# Patient Record
Sex: FEMALE
[De-identification: names, ages, dates, MRNs, and addresses within clinical notes are randomized per-mention and may not be internally consistent; named-entity substitution may affect disease eponyms.]

---

## 2021-11-24 LAB
BUN BLD-MCNC: 18 MG/DL (ref 7–18)
GLUCOSE SERPLBLD-MCNC: 178 MG/DL (ref 74–106)
HCT VFR BLD CALC: 39.1 % (ref 36–45)
INR BLD: 1.03
LYMPHOCYTES # SPEC AUTO: 1.5 K/UL (ref 0.7–4.9)
PMV BLD: 7.2 FL (ref 7.6–11.3)
POTASSIUM SERPL-SCNC: 4.1 MMOL/L (ref 3.5–5.1)
RBC # BLD: 4.44 M/UL (ref 3.86–4.86)

## 2021-11-24 NOTE — RAD REPORT
EXAM DESCRIPTION:  RAD - Chest Pa And Lat (2 Views) - 11/24/2021 9:47 am

 

CLINICAL HISTORY:  Pre-op cath procedure

 

COMPARISON:  None

 

TECHNIQUE:  Frontal and lateral views of the chest were obtained.

 

FINDINGS:  The lungs are clear of failure, infiltrate or mass. Mild fibrotic lung pattern seen.   Hea
rt size is normal and central vasculature is within normal limits.  No pleural effusion or pneumothor
ax seen.  No acute bony finding noted.  No acute aortic finding. Several small surgical clips are pre
sent in the mediastinum in proximity to the aorta.

 

IMPRESSION:  Scattered fibrotic change with no acute cardiopulmonary finding.

## 2021-11-29 ENCOUNTER — HOSPITAL ENCOUNTER (OUTPATIENT)
Dept: HOSPITAL 97 - CCL | Age: 66
Discharge: HOME | End: 2021-11-29
Attending: INTERNAL MEDICINE
Payer: COMMERCIAL

## 2021-11-29 VITALS — SYSTOLIC BLOOD PRESSURE: 124 MMHG | DIASTOLIC BLOOD PRESSURE: 66 MMHG | OXYGEN SATURATION: 97 %

## 2021-11-29 VITALS — TEMPERATURE: 98.5 F

## 2021-11-29 DIAGNOSIS — E11.9: ICD-10-CM

## 2021-11-29 DIAGNOSIS — I50.22: ICD-10-CM

## 2021-11-29 DIAGNOSIS — R42: ICD-10-CM

## 2021-11-29 DIAGNOSIS — Z20.822: ICD-10-CM

## 2021-11-29 DIAGNOSIS — I11.0: ICD-10-CM

## 2021-11-29 DIAGNOSIS — E78.2: ICD-10-CM

## 2021-11-29 DIAGNOSIS — I71.2: ICD-10-CM

## 2021-11-29 DIAGNOSIS — R94.39: Primary | ICD-10-CM

## 2021-11-29 DIAGNOSIS — Z88.6: ICD-10-CM

## 2021-11-29 DIAGNOSIS — K21.9: ICD-10-CM

## 2021-11-29 PROCEDURE — 71046 X-RAY EXAM CHEST 2 VIEWS: CPT

## 2021-11-29 PROCEDURE — 85610 PROTHROMBIN TIME: CPT

## 2021-11-29 PROCEDURE — 85025 COMPLETE CBC W/AUTO DIFF WBC: CPT

## 2021-11-29 PROCEDURE — 93454 CORONARY ARTERY ANGIO S&I: CPT

## 2021-11-29 PROCEDURE — 82947 ASSAY GLUCOSE BLOOD QUANT: CPT

## 2021-11-29 PROCEDURE — 80048 BASIC METABOLIC PNL TOTAL CA: CPT

## 2021-11-29 PROCEDURE — 85730 THROMBOPLASTIN TIME PARTIAL: CPT

## 2021-11-29 PROCEDURE — 36415 COLL VENOUS BLD VENIPUNCTURE: CPT

## 2021-11-29 NOTE — OP
Date of Procedure:  11/29/2021



Surgeon:  James Regalado MD



Assistant:  Tessy Simmons. 



The patient will be in the hospital for 2 hours of bedrest after the procedure.  She will go home aft
er that and I will see her in the office in 2 weeks.  No change in medical therapy for now.



Admitted on 11/29/2021 for left heart catheterization and selective coronary arteriogram.



Indications:  Positive stress test, history of hypertension, congestive heart failure and dyslipidemi
a.



Procedure In Detail:  In the cath lab, she was prepped and draped in routine sterile fashion.  Given 
Versed and fentanyl for sedation.  A 6-[place] sheath introduced in the right common femoral artery mccauley
ccessfully.  Angiography there was normal.  [employer] was used to close the case.  Deepali catheter l
eft and right were used to do the catheterization.  She had normal coronary.  She was left dominant, 
very large circumflex, small RCA, normal left main and LAD.  There were no complications.  Blood loss
 was 5 cc.  Total conscious sedation was 30 minutes.



Postoperative Diagnosis:  Positive stress test.  Normal coronaries.



Plan:  Plan is for medical therapy.





NB/ZEENAT

DD:  11/29/2021 09:18:29Voice ID:  [id number]

DT:  11/29/2021 10:04:12Report ID:  [id number]